# Patient Record
Sex: FEMALE | HISPANIC OR LATINO | URBAN - METROPOLITAN AREA
[De-identification: names, ages, dates, MRNs, and addresses within clinical notes are randomized per-mention and may not be internally consistent; named-entity substitution may affect disease eponyms.]

---

## 2019-05-30 ENCOUNTER — OFFICE VISIT (OUTPATIENT)
Dept: URBAN - METROPOLITAN AREA CLINIC 17 | Facility: CLINIC | Age: 39
End: 2019-05-30
Payer: COMMERCIAL

## 2019-05-30 DIAGNOSIS — H04.123 DRY EYE SYNDROME OF BILATERAL LACRIMAL GLANDS: ICD-10-CM

## 2019-05-30 DIAGNOSIS — H11.151 PINGUECULA, RIGHT EYE: ICD-10-CM

## 2019-05-30 DIAGNOSIS — H20.11 CHRONIC IRIDOCYCLITIS, RIGHT EYE: Primary | ICD-10-CM

## 2019-05-30 DIAGNOSIS — G43.B1 OPHTHALMOPLEGIC MIGRAINE, INTRACTABLE: ICD-10-CM

## 2019-05-30 DIAGNOSIS — Z96.1 PRESENCE OF INTRAOCULAR LENS: ICD-10-CM

## 2019-05-30 PROCEDURE — 99214 OFFICE O/P EST MOD 30 MIN: CPT | Performed by: OPTOMETRIST

## 2019-05-30 RX ORDER — PREDNISOLONE ACETATE 10 MG/ML
1 % SUSPENSION/ DROPS OPHTHALMIC
Qty: 1 | Refills: 0 | Status: ACTIVE
Start: 2019-05-30

## 2019-05-30 ASSESSMENT — INTRAOCULAR PRESSURE
OS: 13
OD: 13

## 2019-05-30 NOTE — IMPRESSION/PLAN
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 Plan: Discussed condition and treatment options with patient.  New Medication(s) Rx given today: Pred Acetate QID OS x7days (ERX'd) then follow up in one week

## 2019-05-30 NOTE — IMPRESSION/PLAN
Impression: Presence of intraocular lens: Z96.1. OD. Plan: IOL in good position, will continue to monitor.

## 2023-04-21 ENCOUNTER — OFFICE VISIT (OUTPATIENT)
Dept: URBAN - METROPOLITAN AREA CLINIC 23 | Facility: CLINIC | Age: 43
End: 2023-04-21
Payer: COMMERCIAL

## 2023-04-21 DIAGNOSIS — H33.022 RETINAL DETACHMENT WITH MULTIPLE BREAKS, LEFT EYE: Primary | ICD-10-CM

## 2023-04-21 PROCEDURE — 92134 CPTRZ OPH DX IMG PST SGM RTA: CPT | Performed by: OPHTHALMOLOGY

## 2023-04-21 PROCEDURE — 99204 OFFICE O/P NEW MOD 45 MIN: CPT | Performed by: OPHTHALMOLOGY

## 2023-04-21 RX ORDER — CIPROFLOXACIN HYDROCHLORIDE 3 MG/ML
0.3 % SOLUTION/ DROPS OPHTHALMIC
Qty: 10 | Refills: 1 | Status: ACTIVE
Start: 2023-04-21

## 2023-04-21 RX ORDER — PREDNISOLONE ACETATE 10 MG/ML
1 % SUSPENSION/ DROPS OPHTHALMIC
Qty: 10 | Refills: 1 | Status: ACTIVE
Start: 2023-04-21

## 2023-04-21 ASSESSMENT — INTRAOCULAR PRESSURE
OS: 13
OD: 13

## 2023-04-21 NOTE — IMPRESSION/PLAN
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 Left. Condition: new problem addtl w/u needed. Vision: vision affected. Plan: Discussed diagnosis in great detail with patient. GONIO exam OS shows Lattice w/ holes within infra-nasal, Shallow RD inferior. Discussed risks of progression. URGENT Surgical treatment is recommended to repair the retina PPVx LEFT EYE w/ PFO. Patient understands that she will need a second surgery in the future when the retina is stable to remove the heavy liquid. All questions answered. RL1. Drops Erx to pharmacy on file. OCT OS shows the retina attached centrally.

## 2023-04-28 ENCOUNTER — POST-OPERATIVE VISIT (OUTPATIENT)
Dept: URBAN - METROPOLITAN AREA CLINIC 17 | Facility: CLINIC | Age: 43
End: 2023-04-28
Payer: COMMERCIAL

## 2023-04-28 DIAGNOSIS — Z48.810 ENCOUNTER FOR SURGICAL AFTERCARE FOLLOWING SURGERY ON A SENSE ORGAN: Primary | ICD-10-CM

## 2023-04-28 PROCEDURE — 99024 POSTOP FOLLOW-UP VISIT: CPT | Performed by: OPTOMETRIST

## 2023-04-28 ASSESSMENT — INTRAOCULAR PRESSURE
OD: 12
OS: 17

## 2023-04-28 NOTE — IMPRESSION/PLAN
Impression: S/P Pars Plana Vitrectomy 25ga [id number]; Intravitreal Injection of Perfluoron; AFX (Air Fluid Gas Exchange); Endo laser OS - 7 Days. Encounter for surgical aftercare following surgery on a sense organ  Z48.810. Plan: Finish Ciprofloxacin QID OS x1 days, then d/c. Continue prednisolone acetate QID.

## 2023-05-15 ENCOUNTER — POST-OPERATIVE VISIT (OUTPATIENT)
Dept: URBAN - METROPOLITAN AREA CLINIC 17 | Facility: CLINIC | Age: 43
End: 2023-05-15
Payer: COMMERCIAL

## 2023-05-15 DIAGNOSIS — Z48.810 ENCOUNTER FOR SURGICAL AFTERCARE FOLLOWING SURGERY ON A SENSE ORGAN: Primary | ICD-10-CM

## 2023-05-15 PROCEDURE — 99024 POSTOP FOLLOW-UP VISIT: CPT | Performed by: OPTOMETRIST

## 2023-05-15 ASSESSMENT — INTRAOCULAR PRESSURE
OD: 12
OS: 24

## 2023-05-15 NOTE — IMPRESSION/PLAN
Impression: S/P Pars Plana Vitrectomy 25ga [id number]; Intravitreal Injection of Perfluoron; AFX (Air Fluid Gas Exchange); Endo laser OS - 24 Days. Encounter for surgical aftercare following surgery on a sense organ  Z48.810. Plan: Discussed with patient that inflammation still present OS. Recommend patient to continue Pred ace QID OS, frequent ATs, and alternate Tylenol and Advil. Patient to contact office if symptoms worsen.

## 2023-05-26 ENCOUNTER — POST-OPERATIVE VISIT (OUTPATIENT)
Dept: URBAN - METROPOLITAN AREA CLINIC 23 | Facility: CLINIC | Age: 43
End: 2023-05-26
Payer: COMMERCIAL

## 2023-05-26 DIAGNOSIS — Z48.810 ENCOUNTER FOR SURGICAL AFTERCARE FOLLOWING SURGERY ON A SENSE ORGAN: Primary | ICD-10-CM

## 2023-05-26 PROCEDURE — 99024 POSTOP FOLLOW-UP VISIT: CPT | Performed by: OPHTHALMOLOGY

## 2023-05-26 ASSESSMENT — INTRAOCULAR PRESSURE
OD: 14
OS: 24

## 2023-05-26 NOTE — IMPRESSION/PLAN
Impression: S/P Pars Plana Vitrectomy 25ga [id number]; Intravitreal Injection of Perfluoron; AFX (Air Fluid Gas Exchange); Endo laser OS - 35 Days. Encounter for surgical aftercare following surgery on a sense organ  Z48.810. Plan: Discussed diagnosis in detail with patient. Exam and Optos OS shows the retina to be attached w/ PFO. Discussed risks of progression. Surgical treatment is recommended to remove scar tissue for vision improvement PPVx LEFT EYE. Surgical risks and benefits were discussed, explained and understood by patient. Pt asked if she can travel after the surgery to [place]. Advised patient that we do not plan on using a gas bubble, most likely she can travel, however if anything is found in surgery, it will be treated in the most appropriate manner. If gas is used she will be unable to travel to higher altitude by air or car. All questions answered. RL1. Patient elects to proceed with recommendation. Pt has drops at home, do not need to send new drops. 

H33.022 - Retinal Detachment OS

## 2023-06-07 ENCOUNTER — POST-OPERATIVE VISIT (OUTPATIENT)
Dept: URBAN - METROPOLITAN AREA CLINIC 17 | Facility: CLINIC | Age: 43
End: 2023-06-07
Payer: COMMERCIAL

## 2023-06-07 DIAGNOSIS — Z48.810 ENCOUNTER FOR SURGICAL AFTERCARE FOLLOWING SURGERY ON A SENSE ORGAN: Primary | ICD-10-CM

## 2023-06-07 PROCEDURE — 99024 POSTOP FOLLOW-UP VISIT: CPT | Performed by: OPHTHALMOLOGY

## 2023-06-07 ASSESSMENT — INTRAOCULAR PRESSURE
OS: 12
OD: 10

## 2023-06-07 NOTE — IMPRESSION/PLAN
Impression: S/P PPVx 25g, AFX, PFO Removal, Kenalog- [id number] OS - 5 Days. Encounter for surgical aftercare following surgery on a sense organ  Z48.810. Plan: Exam, Optos and OCT OS shows the retina to be attached and stable. There is some inflammation on the back of the lens which we hope in time will clear up. Pt to continue Pred QID OS until gone. Will reassess in 3-4 weeks.

## 2023-08-02 ENCOUNTER — POST-OPERATIVE VISIT (OUTPATIENT)
Dept: URBAN - METROPOLITAN AREA CLINIC 17 | Facility: CLINIC | Age: 43
End: 2023-08-02
Payer: COMMERCIAL

## 2023-08-02 DIAGNOSIS — Z48.810 ENCOUNTER FOR SURGICAL AFTERCARE FOLLOWING SURGERY ON A SENSE ORGAN: Primary | ICD-10-CM

## 2023-08-02 PROCEDURE — 99024 POSTOP FOLLOW-UP VISIT: CPT | Performed by: OPHTHALMOLOGY

## 2023-08-02 ASSESSMENT — INTRAOCULAR PRESSURE
OS: 21
OD: 20

## 2023-08-03 ENCOUNTER — OFFICE VISIT (OUTPATIENT)
Dept: URBAN - METROPOLITAN AREA CLINIC 17 | Facility: CLINIC | Age: 43
End: 2023-08-03
Payer: COMMERCIAL

## 2023-08-03 DIAGNOSIS — H25.012 CORTICAL AGE-RELATED CATARACT, LEFT EYE: Primary | ICD-10-CM

## 2023-08-03 DIAGNOSIS — H17.89 OTHER CORNEAL SCARS: ICD-10-CM

## 2023-08-03 PROCEDURE — 99214 OFFICE O/P EST MOD 30 MIN: CPT | Performed by: OPHTHALMOLOGY

## 2023-08-03 RX ORDER — PREDNISOLONE ACETATE 10 MG/ML
1 % SUSPENSION/ DROPS OPHTHALMIC
Qty: 10 | Refills: 1 | Status: ACTIVE
Start: 2023-08-03

## 2023-08-03 RX ORDER — CIPROFLOXACIN HYDROCHLORIDE 3 MG/ML
0.3 % SOLUTION/ DROPS OPHTHALMIC
Qty: 10 | Refills: 1 | Status: ACTIVE
Start: 2023-08-03

## 2023-08-03 ASSESSMENT — VISUAL ACUITY
OS: 20/70
OD: 20/30

## 2023-08-03 ASSESSMENT — INTRAOCULAR PRESSURE
OD: 11
OS: 11

## 2023-08-03 ASSESSMENT — KERATOMETRY
OD: 41.13
OS: 39.50

## 2023-08-09 ENCOUNTER — PRE-OPERATIVE VISIT (OUTPATIENT)
Dept: URBAN - METROPOLITAN AREA CLINIC 17 | Facility: CLINIC | Age: 43
End: 2023-08-09
Payer: COMMERCIAL

## 2023-08-09 DIAGNOSIS — H25.012 CORTICAL AGE-RELATED CATARACT, LEFT EYE: Primary | ICD-10-CM

## 2023-08-09 ASSESSMENT — PACHYMETRY
OS: 26.73
OS: 3.62

## 2023-08-24 ENCOUNTER — SURGERY (OUTPATIENT)
Dept: URBAN - METROPOLITAN AREA SURGERY 7 | Facility: SURGERY | Age: 43
End: 2023-08-24
Payer: COMMERCIAL

## 2023-08-24 DIAGNOSIS — H25.012 CORTICAL AGE-RELATED CATARACT, LEFT EYE: Primary | ICD-10-CM

## 2023-08-24 PROCEDURE — 66984 XCAPSL CTRC RMVL W/O ECP: CPT | Performed by: OPHTHALMOLOGY

## 2023-08-25 ENCOUNTER — POST-OPERATIVE VISIT (OUTPATIENT)
Dept: URBAN - METROPOLITAN AREA CLINIC 17 | Facility: CLINIC | Age: 43
End: 2023-08-25
Payer: COMMERCIAL

## 2023-08-25 DIAGNOSIS — Z96.1 PRESENCE OF INTRAOCULAR LENS: Primary | ICD-10-CM

## 2023-08-25 PROCEDURE — 99024 POSTOP FOLLOW-UP VISIT: CPT

## 2023-08-25 ASSESSMENT — INTRAOCULAR PRESSURE
OD: 17
OS: 18

## 2023-08-31 ENCOUNTER — POST-OPERATIVE VISIT (OUTPATIENT)
Dept: URBAN - METROPOLITAN AREA CLINIC 17 | Facility: CLINIC | Age: 43
End: 2023-08-31
Payer: COMMERCIAL

## 2023-08-31 DIAGNOSIS — Z96.1 PRESENCE OF INTRAOCULAR LENS: Primary | ICD-10-CM

## 2023-08-31 PROCEDURE — 99024 POSTOP FOLLOW-UP VISIT: CPT | Performed by: OPHTHALMOLOGY

## 2023-08-31 ASSESSMENT — INTRAOCULAR PRESSURE
OD: 12
OS: 13

## 2023-09-25 ENCOUNTER — POST-OPERATIVE VISIT (OUTPATIENT)
Dept: URBAN - METROPOLITAN AREA CLINIC 17 | Facility: CLINIC | Age: 43
End: 2023-09-25
Payer: COMMERCIAL

## 2023-09-25 DIAGNOSIS — H26.492 OTHER SECONDARY CATARACT, LEFT EYE: ICD-10-CM

## 2023-09-25 DIAGNOSIS — Z48.810 ENCOUNTER FOR SURGICAL AFTERCARE FOLLOWING SURGERY ON A SENSE ORGAN: Primary | ICD-10-CM

## 2023-09-25 PROCEDURE — 99024 POSTOP FOLLOW-UP VISIT: CPT | Performed by: OPHTHALMOLOGY

## 2023-09-25 ASSESSMENT — VISUAL ACUITY
OD: 20/20
OS: 20/30

## 2023-09-25 ASSESSMENT — INTRAOCULAR PRESSURE
OD: 13
OS: 13

## 2023-10-03 ENCOUNTER — SURGERY (OUTPATIENT)
Dept: URBAN - METROPOLITAN AREA SURGERY 7 | Facility: SURGERY | Age: 43
End: 2023-10-03
Payer: COMMERCIAL

## 2023-10-03 PROCEDURE — 66821 AFTER CATARACT LASER SURGERY: CPT | Performed by: OPHTHALMOLOGY
